# Patient Record
Sex: FEMALE | URBAN - METROPOLITAN AREA
[De-identification: names, ages, dates, MRNs, and addresses within clinical notes are randomized per-mention and may not be internally consistent; named-entity substitution may affect disease eponyms.]

---

## 2024-03-08 ENCOUNTER — OV NP (OUTPATIENT)
Dept: URBAN - METROPOLITAN AREA CLINIC 105 | Facility: CLINIC | Age: 54
End: 2024-03-08
Payer: COMMERCIAL

## 2024-03-08 VITALS
WEIGHT: 135 LBS | BODY MASS INDEX: 20.46 KG/M2 | SYSTOLIC BLOOD PRESSURE: 136 MMHG | DIASTOLIC BLOOD PRESSURE: 81 MMHG | HEART RATE: 76 BPM | HEIGHT: 68 IN | TEMPERATURE: 98.1 F

## 2024-03-08 DIAGNOSIS — F41.8 ANXIETY ABOUT DYING: ICD-10-CM

## 2024-03-08 DIAGNOSIS — R19.7 DIARRHEA OF PRESUMED INFECTIOUS ORIGIN: ICD-10-CM

## 2024-03-08 DIAGNOSIS — Z12.11 SCREEN FOR COLON CANCER: ICD-10-CM

## 2024-03-08 DIAGNOSIS — R19.4 CHANGE IN BOWEL HABITS: ICD-10-CM

## 2024-03-08 PROBLEM — 231504006: Status: ACTIVE | Noted: 2024-03-08

## 2024-03-08 PROCEDURE — 99203 OFFICE O/P NEW LOW 30 MIN: CPT | Performed by: INTERNAL MEDICINE

## 2024-03-08 PROCEDURE — 99243 OFF/OP CNSLTJ NEW/EST LOW 30: CPT | Performed by: INTERNAL MEDICINE

## 2024-03-08 RX ORDER — METRONIDAZOLE 500 MG/1
1 TABLET TABLET ORAL THREE TIMES A DAY
Qty: 30 | Refills: 0 | OUTPATIENT
Start: 2024-03-08 | End: 2024-03-18

## 2024-03-08 RX ORDER — CIPROFLOXACIN HYDROCHLORIDE 500 MG/1
1 TABLET TABLET, FILM COATED ORAL
Qty: 20 TABLET | Refills: 0 | OUTPATIENT
Start: 2024-03-08 | End: 2024-03-18

## 2024-03-08 NOTE — HPI-TODAY'S VISIT:
3/8/24 54 yo lady pt of  Dr Niraj Fischer. Referred for abd discomfort.  Appt was made few weeks ago for post prandial abd pain and some post prandial nausea. Says it did nof affect BMs - unchanged.  Because she wasnt eating much she was some constipated. Says since then friends recommended probiotics/prebiotics and kombucha and says they have helped.  She was then able to start eating her regular diet again 3 days ago developed chills and bad diarrhea. Even water would cause nausea and fatigue and diarrhea. Having at least one BM during waking hours. No nocturnal stools.  Still has diarrhea. Chills have resolved. Nausea is now very subtle. Most of the fatigue is gone.  Some periumbilical rumblining no pain.  No nsaid use regularly.  Normally her BMs are once a day.  Every now and then will have a little pink or red in stool  No fhx of colon CA Has not had a colonoscopy. Grandmother used to have polyps.  no recent travel. no sick contacts. Had a baby shower 3 weeks ago and sick babies there - lots of potato salad and fruid.

## 2024-04-05 ENCOUNTER — OV EP (OUTPATIENT)
Dept: URBAN - METROPOLITAN AREA CLINIC 105 | Facility: CLINIC | Age: 54
End: 2024-04-05
Payer: COMMERCIAL

## 2024-04-05 ENCOUNTER — LAB (OUTPATIENT)
Dept: URBAN - METROPOLITAN AREA CLINIC 105 | Facility: CLINIC | Age: 54
End: 2024-04-05

## 2024-04-05 VITALS
BODY MASS INDEX: 20.16 KG/M2 | DIASTOLIC BLOOD PRESSURE: 71 MMHG | SYSTOLIC BLOOD PRESSURE: 122 MMHG | HEIGHT: 68 IN | HEART RATE: 60 BPM | WEIGHT: 133 LBS | TEMPERATURE: 97.1 F

## 2024-04-05 DIAGNOSIS — R19.4 CHANGE IN BOWEL HABITS: ICD-10-CM

## 2024-04-05 DIAGNOSIS — R10.10 UPPER ABDOMINAL PAIN: ICD-10-CM

## 2024-04-05 DIAGNOSIS — Z12.11 SCREEN FOR COLON CANCER: ICD-10-CM

## 2024-04-05 PROBLEM — 305058001: Status: ACTIVE | Noted: 2024-04-05

## 2024-04-05 PROCEDURE — 99213 OFFICE O/P EST LOW 20 MIN: CPT | Performed by: INTERNAL MEDICINE

## 2024-04-05 RX ORDER — POLYETHYLENE GLYCOL-3350 AND ELECTROLYTES WITH FLAVOR PACK 240; 5.84; 2.98; 6.72; 22.72 G/278.26G; G/278.26G; G/278.26G; G/278.26G; G/278.26G
4000ML POWDER, FOR SOLUTION ORAL 1
Qty: 4000 | Refills: 0 | OUTPATIENT
Start: 2024-04-05 | End: 2024-04-06

## 2024-04-05 NOTE — HPI-TODAY'S VISIT:
3/8/24 52 yo lady pt of  Dr Niraj Fischer. Referred for abd discomfort.  Appt was made few weeks ago for post prandial abd pain and some post prandial nausea. Says it did nof affect BMs - unchanged.  Because she wasnt eating much she was some constipated. Says since then friends recommended probiotics/prebiotics and kombucha and says they have helped.  She was then able to start eating her regular diet again 3 days ago developed chills and bad diarrhea. Even water would cause nausea and fatigue and diarrhea. Having at least one BM during waking hours. No nocturnal stools.  Still has diarrhea. Chills have resolved. Nausea is now very subtle. Most of the fatigue is gone.  Some periumbilical rumblining no pain.  No nsaid use regularly.  Normally her BMs are once a day.  Every now and then will have a little pink or red in stool  No fhx of colon CA Has not had a colonoscopy. Grandmother used to have polyps.  no recent travel. no sick contacts. Had a baby shower 3 weeks ago and sick babies there - lots of potato salad and fruid.  04/05/2024 here as a follow-up. She states her diarrhea and nausea has resolved. Has some reported sharp epigastric, LUQ and RUQ pain with eating. No known food triggers. She denies NSAID use.

## 2024-04-25 ENCOUNTER — OV EP (OUTPATIENT)
Dept: URBAN - METROPOLITAN AREA CLINIC 105 | Facility: CLINIC | Age: 54
End: 2024-04-25
Payer: COMMERCIAL

## 2024-04-25 VITALS
TEMPERATURE: 97 F | HEIGHT: 68 IN | WEIGHT: 136.2 LBS | DIASTOLIC BLOOD PRESSURE: 67 MMHG | HEART RATE: 64 BPM | BODY MASS INDEX: 20.64 KG/M2 | SYSTOLIC BLOOD PRESSURE: 113 MMHG

## 2024-04-25 DIAGNOSIS — R10.10 UPPER ABDOMINAL PAIN: ICD-10-CM

## 2024-04-25 DIAGNOSIS — R10.11 RUQ PAIN: ICD-10-CM

## 2024-04-25 DIAGNOSIS — R19.4 CHANGE IN BOWEL HABITS: ICD-10-CM

## 2024-04-25 DIAGNOSIS — R11.10 ACUTE VOMITING: ICD-10-CM

## 2024-04-25 PROCEDURE — 99214 OFFICE O/P EST MOD 30 MIN: CPT

## 2024-04-25 RX ORDER — PANTOPRAZOLE SODIUM 40 MG/1
1 TABLET ON EMPTY STOMACH 30 MINS BEFORE FIRST MEAL OF THE DAY TABLET, DELAYED RELEASE ORAL ONCE A DAY
Qty: 30 | Refills: 2 | OUTPATIENT
Start: 2024-04-25

## 2024-04-25 NOTE — HPI-TODAY'S VISIT:
3/8/24 52 yo lady pt of  Dr Niraj Fischer. Referred for abd discomfort.  Appt was made few weeks ago for post prandial abd pain and some post prandial nausea. Says it did nof affect BMs - unchanged.  Because she wasnt eating much she was some constipated. Says since then friends recommended probiotics/prebiotics and kombucha and says they have helped.  She was then able to start eating her regular diet again 3 days ago developed chills and bad diarrhea. Even water would cause nausea and fatigue and diarrhea. Having at least one BM during waking hours. No nocturnal stools.  Still has diarrhea. Chills have resolved. Nausea is now very subtle. Most of the fatigue is gone.  Some periumbilical rumblining no pain.  No nsaid use regularly.  Normally her BMs are once a day.  Every now and then will have a little pink or red in stool  No fhx of colon CA Has not had a colonoscopy. Grandmother used to have polyps.  no recent travel. no sick contacts. Had a baby shower 3 weeks ago and sick babies there - lots of potato salad and fruid.  04/05/2024 here as a follow-up. She states her diarrhea and nausea has resolved. Has some reported sharp epigastric, LUQ and RUQ pain with eating. No known food triggers. She denies NSAID use.  04/25/2024 Pt presents to discuss recurrence of abdominal pain. Feels like it is in a similar place as before, but more intense and lasting longer. Previously when she would eat, she would have sharp pains in epigastric area that would subside after 1 hour. This episode has been more severe pain, since 5 days ago. Symptoms has improved 3 days ago but then worsened againt the next day. Accompanying sensation of "pressure" at the base of her throat after eating. With belching there would be some regurgitation, but this was just over the weekend. Denies dysphagia and heartburn. She has had constipation and diarrhea. She had not had BM for 2 days so she took gummy laxative and then had vomiting in the middle of that night. She then had a normal BM the next day. Then 1 episode of diarrhea 2 days ago. She has not had BM since 2 days ago, but notes she has not been eating much. Denies rectal bleeding or melena. She has had vomiting x1 of brown material. Denies hematemesis. She did not have much appetite for the first couple of days, but it has improved some.  She has been doing a food diary with this episode. She noticed that on the days she had carrots, the pain was the worst. She is wondering whether the batch of carrots she has been eating is bad. She denies past surgical history. She did not complete H pylori breath test ordered at last visit.

## 2024-04-25 NOTE — PHYSICAL EXAM GASTROINTESTINAL
Abdomen soft, tender in epigastrium and RUQ, nondistended, no guarding or rigidity, no masses palpable, normal bowel sounds Liver and Spleen no hepatosplenomegaly Rectal deferred

## 2024-05-15 ENCOUNTER — WEB ENCOUNTER (OUTPATIENT)
Dept: URBAN - METROPOLITAN AREA CLINIC 105 | Facility: CLINIC | Age: 54
End: 2024-05-15

## 2024-05-16 ENCOUNTER — OFFICE VISIT (OUTPATIENT)
Dept: URBAN - METROPOLITAN AREA CLINIC 105 | Facility: CLINIC | Age: 54
End: 2024-05-16

## 2024-05-22 ENCOUNTER — OFFICE VISIT (OUTPATIENT)
Dept: URBAN - METROPOLITAN AREA CLINIC 105 | Facility: CLINIC | Age: 54
End: 2024-05-22
Payer: COMMERCIAL

## 2024-05-22 ENCOUNTER — DASHBOARD ENCOUNTERS (OUTPATIENT)
Age: 54
End: 2024-05-22

## 2024-05-22 VITALS
SYSTOLIC BLOOD PRESSURE: 118 MMHG | WEIGHT: 137 LBS | TEMPERATURE: 97.4 F | HEART RATE: 66 BPM | BODY MASS INDEX: 20.76 KG/M2 | DIASTOLIC BLOOD PRESSURE: 70 MMHG | HEIGHT: 68 IN

## 2024-05-22 DIAGNOSIS — R19.4 CHANGE IN BOWEL HABITS: ICD-10-CM

## 2024-05-22 DIAGNOSIS — R10.11 RUQ PAIN: ICD-10-CM

## 2024-05-22 DIAGNOSIS — R10.13 ABDOMINAL DISCOMFORT, EPIGASTRIC: ICD-10-CM

## 2024-05-22 DIAGNOSIS — R11.11 INTRACTABLE VOMITING WITHOUT NAUSEA: ICD-10-CM

## 2024-05-22 PROCEDURE — 99213 OFFICE O/P EST LOW 20 MIN: CPT | Performed by: INTERNAL MEDICINE

## 2024-05-22 RX ORDER — PANTOPRAZOLE SODIUM 40 MG/1
1 TABLET ON EMPTY STOMACH 30 MINS BEFORE FIRST MEAL OF THE DAY TABLET, DELAYED RELEASE ORAL ONCE A DAY
Qty: 30 | Refills: 2 | Status: ACTIVE | COMMUNITY
Start: 2024-04-25

## 2024-05-29 ENCOUNTER — OFFICE VISIT (OUTPATIENT)
Dept: URBAN - METROPOLITAN AREA SURGERY CENTER 16 | Facility: SURGERY CENTER | Age: 54
End: 2024-05-29

## 2024-06-05 ENCOUNTER — OUT OF OFFICE VISIT (OUTPATIENT)
Dept: URBAN - METROPOLITAN AREA SURGERY CENTER 13 | Facility: SURGERY CENTER | Age: 54
End: 2024-06-05
Payer: COMMERCIAL

## 2024-06-05 ENCOUNTER — CLAIMS CREATED FROM THE CLAIM WINDOW (OUTPATIENT)
Dept: URBAN - METROPOLITAN AREA CLINIC 4 | Facility: CLINIC | Age: 54
End: 2024-06-05
Payer: COMMERCIAL

## 2024-06-05 DIAGNOSIS — K21.9 GASTRO-ESOPHAGEAL REFLUX DISEASE WITHOUT ESOPHAGITIS: ICD-10-CM

## 2024-06-05 DIAGNOSIS — K29.70 GASTRITIS, UNSPECIFIED, WITHOUT BLEEDING: ICD-10-CM

## 2024-06-05 DIAGNOSIS — D12.2 ADENOMA OF ASCENDING COLON: ICD-10-CM

## 2024-06-05 DIAGNOSIS — Z12.11 ENCOUNTER FOR SCREENING FOR MALIGNANT NEOPLASM OF COLON: ICD-10-CM

## 2024-06-05 DIAGNOSIS — K31.89 OTHER DISEASES OF STOMACH AND DUODENUM: ICD-10-CM

## 2024-06-05 DIAGNOSIS — K63.5 POLYP OF ASCENDING COLON, UNSPECIFIED TYPE: ICD-10-CM

## 2024-06-05 DIAGNOSIS — R10.13 DYSPEPSIA: ICD-10-CM

## 2024-06-05 DIAGNOSIS — Z12.11 COLON CANCER SCREENING: ICD-10-CM

## 2024-06-05 DIAGNOSIS — K64.8 OTHER HEMORRHOIDS: ICD-10-CM

## 2024-06-05 DIAGNOSIS — D12.2 BENIGN NEOPLASM OF ASCENDING COLON: ICD-10-CM

## 2024-06-05 PROCEDURE — 88312 SPECIAL STAINS GROUP 1: CPT | Performed by: PATHOLOGY

## 2024-06-05 PROCEDURE — 00813 ANES UPR LWR GI NDSC PX: CPT | Performed by: ANESTHESIOLOGY

## 2024-06-05 PROCEDURE — 43239 EGD BIOPSY SINGLE/MULTIPLE: CPT | Performed by: INTERNAL MEDICINE

## 2024-06-05 PROCEDURE — 88305 TISSUE EXAM BY PATHOLOGIST: CPT | Performed by: PATHOLOGY

## 2024-06-05 PROCEDURE — G8907 PT DOC NO EVENTS ON DISCHARG: HCPCS | Performed by: INTERNAL MEDICINE

## 2024-06-05 PROCEDURE — 45380 COLONOSCOPY AND BIOPSY: CPT | Performed by: INTERNAL MEDICINE

## 2024-06-05 PROCEDURE — 45385 COLONOSCOPY W/LESION REMOVAL: CPT | Performed by: INTERNAL MEDICINE

## 2024-06-05 PROCEDURE — 00813 ANES UPR LWR GI NDSC PX: CPT | Performed by: ANESTHESIOLOGIST ASSISTANT

## 2024-06-05 RX ORDER — PANTOPRAZOLE SODIUM 40 MG/1
1 TABLET ON EMPTY STOMACH 30 MINS BEFORE FIRST MEAL OF THE DAY TABLET, DELAYED RELEASE ORAL ONCE A DAY
Qty: 30 | Refills: 2 | Status: ACTIVE | COMMUNITY
Start: 2024-04-25

## 2024-06-19 ENCOUNTER — OFFICE VISIT (OUTPATIENT)
Dept: URBAN - METROPOLITAN AREA CLINIC 105 | Facility: CLINIC | Age: 54
End: 2024-06-19
Payer: COMMERCIAL

## 2024-06-19 VITALS
DIASTOLIC BLOOD PRESSURE: 69 MMHG | BODY MASS INDEX: 20.52 KG/M2 | HEIGHT: 68 IN | WEIGHT: 135.4 LBS | HEART RATE: 73 BPM | TEMPERATURE: 97.3 F | SYSTOLIC BLOOD PRESSURE: 114 MMHG

## 2024-06-19 DIAGNOSIS — K64.4 HEMORRHOIDS, EXTERNAL: ICD-10-CM

## 2024-06-19 DIAGNOSIS — K64.8 HEMORRHOIDS, INTERNAL: ICD-10-CM

## 2024-06-19 DIAGNOSIS — D12.2 ADENOMA OF ASCENDING COLON: ICD-10-CM

## 2024-06-19 DIAGNOSIS — R10.84 ABDOMINAL PAIN, GENERALIZED: ICD-10-CM

## 2024-06-19 PROBLEM — 428283002: Status: ACTIVE | Noted: 2024-06-19

## 2024-06-19 PROCEDURE — 99214 OFFICE O/P EST MOD 30 MIN: CPT | Performed by: INTERNAL MEDICINE

## 2024-06-19 RX ORDER — AMITRIPTYLINE HYDROCHLORIDE 10 MG/1
1 TABLET AT BEDTIME TABLET, FILM COATED ORAL ONCE A DAY
Qty: 30 TABLET | Refills: 2 | OUTPATIENT
Start: 2024-06-19

## 2024-06-19 RX ORDER — PANTOPRAZOLE SODIUM 40 MG/1
1 TABLET ON EMPTY STOMACH 30 MINS BEFORE FIRST MEAL OF THE DAY TABLET, DELAYED RELEASE ORAL ONCE A DAY
Qty: 30 | Refills: 2 | Status: ON HOLD | COMMUNITY
Start: 2024-04-25

## 2024-06-24 ENCOUNTER — OFFICE VISIT (OUTPATIENT)
Dept: URBAN - METROPOLITAN AREA TELEHEALTH 2 | Facility: TELEHEALTH | Age: 54
End: 2024-06-24

## 2024-06-24 RX ORDER — AMITRIPTYLINE HYDROCHLORIDE 10 MG/1
1 TABLET AT BEDTIME TABLET, FILM COATED ORAL ONCE A DAY
Qty: 30 TABLET | Refills: 2 | Status: ACTIVE | COMMUNITY
Start: 2024-06-19

## 2024-06-24 RX ORDER — PANTOPRAZOLE SODIUM 40 MG/1
1 TABLET ON EMPTY STOMACH 30 MINS BEFORE FIRST MEAL OF THE DAY TABLET, DELAYED RELEASE ORAL ONCE A DAY
Qty: 30 | Refills: 2 | Status: ON HOLD | COMMUNITY
Start: 2024-04-25

## 2024-06-25 ENCOUNTER — OFFICE VISIT (OUTPATIENT)
Dept: URBAN - METROPOLITAN AREA TELEHEALTH 2 | Facility: TELEHEALTH | Age: 54
End: 2024-06-25

## 2024-06-25 RX ORDER — PANTOPRAZOLE SODIUM 40 MG/1
1 TABLET ON EMPTY STOMACH 30 MINS BEFORE FIRST MEAL OF THE DAY TABLET, DELAYED RELEASE ORAL ONCE A DAY
Qty: 30 | Refills: 2 | Status: ON HOLD | COMMUNITY
Start: 2024-04-25

## 2024-06-25 RX ORDER — AMITRIPTYLINE HYDROCHLORIDE 10 MG/1
1 TABLET AT BEDTIME TABLET, FILM COATED ORAL ONCE A DAY
Qty: 30 TABLET | Refills: 2 | Status: ACTIVE | COMMUNITY
Start: 2024-06-19

## 2024-07-09 ENCOUNTER — P2P PATIENT RECORD (OUTPATIENT)
Age: 54
End: 2024-07-09

## 2024-08-20 ENCOUNTER — OFFICE VISIT (OUTPATIENT)
Dept: URBAN - METROPOLITAN AREA CLINIC 105 | Facility: CLINIC | Age: 54
End: 2024-08-20